# Patient Record
Sex: MALE | Race: WHITE | Employment: OTHER | URBAN - METROPOLITAN AREA
[De-identification: names, ages, dates, MRNs, and addresses within clinical notes are randomized per-mention and may not be internally consistent; named-entity substitution may affect disease eponyms.]

---

## 2017-06-02 ENCOUNTER — DOCUMENTATION ONLY (OUTPATIENT)
Dept: ONCOLOGY | Age: 82
End: 2017-06-02

## 2017-06-02 ENCOUNTER — TELEPHONE (OUTPATIENT)
Dept: ONCOLOGY | Age: 82
End: 2017-06-02

## 2017-06-02 NOTE — TELEPHONE ENCOUNTER
I will also send a copy of this to his PCP, Dr. Billy Begum. I described what is included in the survivorship care plan.  I also encouraged him to take it to his next appt with Dr. Pedro Andre for any questions he may have about it.  Also, encouraged him to contact me if he needs additional support, education or area resources, or has any follow-up questions.  He is agreeable to this plan.

## 2017-06-02 NOTE — PROGRESS NOTES
[address], [facility] 22.    Prostate Cancer Survivorship Care Plan    GENERAL INFORMATION    NAME/AGE/GENDER: Marco Brock is a 80 y.o. male who was born on 9/6/1933. PHONE: [phone]     Date: 6/2/2017    Referring Provider: Dr. Rocky Murphy    Patient Care Team:  Andrew Webb MD as PCP - [facility] [phone]  Kaitlin Downs MD as Physician (Radiation Oncology) [facility] [phone]  Nelida Merida MD as Physician (Urology) [address] [place] [address] Urology [phone]  Mac Lynch NP as Nurse Practitioner (Cancer Survivorship) Medical Oncology [phone]    DIAGNOSIS    Cancer type/histologic subtype: Prostate cancer/Adenocarcinoma    Dimitri Score: 4 + 5 = 9 in one core and 4 + 3 = 7 in 2 additional cores    PSA at Diagnosis: 8.61 ng/mL on 7/25/16       Date of biopsy: 10/13/16    TNM: [id number]    Risk grouping: High risk    FOLLOW-UP CARE PLAN    Cancer Surveillance or Other Recommended Related Tests        Name of test  When/How often Ordering Provider   PSA Every 3-6 months for the first 2 years, every 6 months for years 3-5, and annually after 5 years Dr. Rocky Murphy  and Dr. Guillermo Araujo (WINDY) Every year Dr. Rocky Murphy and Dr. Tatyana Nicole     Please continue to see your primary care provider for all general health care recommended for a man your age, including cancer screening tests.     Possible late and long-term effects that someone with this type of cancer and treatment may experience:  nocturia (nighttime urination), dysuria (painful or difficult urination), hematuria (blood in the urine), increased daytime urinary frequency, urinary obstruction/retention, urethral stricture formation, incontinence of urine or stool, acute and chronic cystitis (bladder inflammation), diarrhea, rectal damage/bleeding, acute and chronic proctitis (rectal inflammation), worsening and permanent erectile dysfunction, secondary cancers, and the potential need for supplemental treatment    Schedule of Clinical Visits        Coordinating Provider  When/How often Contact information   Primary Care Provider As needed for non-cancer health care [phone]   Radiation Oncologist As required by specific treatment plan [phone]     Urologist As required by specific treatment plan [phone]         CANCER TREATMENT SUMMARY    Surgery: Date and procedure/location/findings: No    External Beam Radiation: Yes, IMRT (intensity modulated radiation therapy)    Prostate, Seminal Vesicles and Pelvic Lymph Nodes: Yes End date (year): 2/9/17   Total dose/location: 4500 cGy to pelvic lymph nodes and 7740 cGy to prostate and seminal vesicles     Brachytherapy to Prostate: No     Systemic Therapy (chemotherapy, biologics, other): No    Persistent symptoms or side effects at completion of treatment: At your follow-up appointment with Dr. Carmita Murray, you were continuing to improve. You described nocturia (night-time voiding) of 3-4 times per night and a daytime voiding interval of 3-4 hours. The only other symptom mentioned in your office note is that your hot flashes have remained stable.     Clinical Trial: No     Date of other cancer and/or recurrence of primary cancer and subsequent treatment: none, according to documentation in [facility], your [address] [place] [address] record or in [facility], your radiation therapy medical record    FAMILIAL CANCER RISK ASSESSMENT    Family history of cancer: your mother had either colon cancer or cervical cancer at age 89--the two medical records referred to above have differing family history of cancer    Genetic/hereditary risk factor(s) or predisposing conditions: none apparent  Genetic testing: No     CONTINUING TREATMENT    Need for Ongoing (Adjuvant) Treatment for Cancer: Yes, Leuprolide (Lupron) 45 mg by intramuscular (IM) injections every 6 months x 2 years; you began this treatment on 11/15/16 and had your second injection on 5/17/17                Additional treatment name How often/For how long Possible side effects   Leuprolide (Lupron) IM injections or  Goserelin (Zoladex)  Triptorelin (Trelstar)  Histrelin (Vantas)  LHRH  (lutenizing hormone-releasing hormone) agonist  Frequency depends on dosage ordered by your urologist/Recommended for 2-3 years Hot flashes/night sweats, decreased libido (sex drive), erectile dysfunction, increased bone pain, urinary retention, swelling of feet or ankles, elevated cholesterol, loss of strength     DOING YOUR PART AS A CANCER SURVIVOR    Many survivors feel worried or anxious that the cancer will come back after treatment. While it often does not, its important to talk with your doctor about the possibility of the cancer returning. Tell your doctor if you notice any of the following symptoms, as they may be signs of a cancer recurrence:     · Bone pain  · Chest pain  · Abdominal pain  · Shortness of breath or difficulty breathing  · Persistent headaches  · Persistent coughing    Or any other symptoms should be brought to the attention of your provider:   1. Anything that represents a brand new symptom   2. Anything that represents a persistent symptom   3.  Anything you are worried about that might be related to the cancer coming  back    Prostate Cancer Survivorship Guidelines:  1--Achieve/maintain a healthy weight by limiting consumption of high-calorie foods and beverages and increasing physical activity  2--Engage in at least 150 minutes/week of physical activity, which may include weight-bearing exercises  3--Eat a diet high in fruits, vegetables and whole grains and low in saturated fat  4--Take at least 600 units of vitamin D daily and have no more than 1200 mg of calcium in your daily diet  5--Limit alcohol intake to 2 servings/day or less  6--Quit or don't start to use any tobacco products  ACS Prostate Cancer Survivorship Care Guidelines July/August 2014    Cancer survivors may experience issues with the areas listed below. If you have any concerns in these or other areas, please speak with your survivorship nurse practitioner or a member of your physician team to find out how to get help with them. Anxiety or depression, emotional and mental health, fatigue, weight changes, stopping smoking, physical functioning, insurance, financial advice/assistance, school/work issues, parenting, memory or concentration loss, fertility, sexual functioning, or any other survivorship concerns            A number of lifestyle/behaviors can affect your ongoing health, including the risk for the cancer coming back or developing another cancer. Discuss these recommendations with your doctor or nurse practitioner:    Tobacco use/cessation, diet, alcohol use, weight management (loss/gain), physical activity, sunscreen use, management of medications, management of other illnesses, or any other healthy living recommendations you'd like to discuss  [address]  Prostate Cancer Specific:  The Prostate [address] [place] [address] prostate. club  Prostate [facility] [address] [place] [address] [address] www.pcf.org  The Prostate Net www. prostate-online. org  Us Too www.ustoo. org  Zero The End of Prostate Cancer www.zerocancer. org  Prostate Cancer Survivorship InterviewAlert.dk  Long-term Care Guidelines for Prostate Cancer Survivors http://Verical/. org/cancer/news/news/longterm-care-guidelines-for-prostate-cancer-survivors   Hormone Deprivation Symptoms in Men http://Verical/. net/navigating-cancer-care/side-effects/hormone-deprivation-symptoms-men   The National Association for Continence 69 849 69 22  Wound Ostomy and Continence Nurses Society www.wocn. org    General:  [place] www.[facility]. [facility] www.cancer. [address] www.cancer. org  American Society of Clinical Oncology http://ryan-cuevas.com/. net/research-and-advocacy/asco-care-and-treatment-  recommendations-patients/follow-care-breast-cancer  [facility] [address] www.AdNectarr. org  Cancer and Careers www.cancerandcareers. org    Prepared By: Fernanda HOLDER with input from your radiation oncologist  [address] [place] [address]  [phone] or [phone]  [email or web address]    Delivered on: 6/2/17    This [facility] is a cancer treatment summary and follow-up plan provided to you to keep with your healthcare records and to share with your healthcare providers. This summary is a brief record of major aspects of your cancer treatment, not a detailed or comprehensive record of your care.

## 2017-10-03 ENCOUNTER — HOSPITAL ENCOUNTER (OUTPATIENT)
Dept: GENERAL RADIOLOGY | Age: 82
Discharge: HOME | End: 2017-10-03
Attending: PHYSICAL MEDICINE & REHABILITATION
Payer: MEDICARE

## 2017-10-03 ENCOUNTER — HOSPITAL ENCOUNTER (OUTPATIENT)
Dept: MRI IMAGING | Age: 82
Discharge: HOME | End: 2017-10-03
Attending: PHYSICAL MEDICINE & REHABILITATION

## 2017-10-03 DIAGNOSIS — M54.16 LUMBAR RADICULOPATHY: ICD-10-CM

## 2017-10-03 DIAGNOSIS — M54.17 LUMBOSACRAL RADICULITIS: ICD-10-CM

## 2017-10-03 PROCEDURE — 72110 X-RAY EXAM L-2 SPINE 4/>VWS: CPT

## 2017-10-12 ENCOUNTER — HOSPITAL ENCOUNTER (OUTPATIENT)
Dept: GENERAL RADIOLOGY | Age: 82
Discharge: HOME | End: 2017-10-12
Payer: MEDICARE

## 2017-10-12 DIAGNOSIS — M19.90 OSTEOARTHRITIS: ICD-10-CM

## 2017-10-12 PROCEDURE — 73521 X-RAY EXAM HIPS BI 2 VIEWS: CPT

## 2018-06-19 ENCOUNTER — HOSPITAL ENCOUNTER (EMERGENCY)
Age: 83
Discharge: HOME | End: 2018-06-19
Attending: EMERGENCY MEDICINE
Payer: MEDICARE

## 2018-06-19 VITALS
SYSTOLIC BLOOD PRESSURE: 199 MMHG | TEMPERATURE: 98.8 F | RESPIRATION RATE: 18 BRPM | HEART RATE: 69 BPM | HEIGHT: 73 IN | OXYGEN SATURATION: 97 % | DIASTOLIC BLOOD PRESSURE: 88 MMHG | BODY MASS INDEX: 32.2 KG/M2 | WEIGHT: 243 LBS

## 2018-06-19 DIAGNOSIS — M54.50 ACUTE BILATERAL LOW BACK PAIN WITHOUT SCIATICA: Primary | ICD-10-CM

## 2018-06-19 PROCEDURE — 99282 EMERGENCY DEPT VISIT SF MDM: CPT

## 2018-06-19 RX ORDER — METHYLPREDNISOLONE 4 MG/1
TABLET ORAL
Qty: 1 DOSE PACK | Refills: 0 | Status: SHIPPED | OUTPATIENT
Start: 2018-06-19

## 2018-06-19 RX ORDER — TRAMADOL HYDROCHLORIDE 50 MG/1
50 TABLET ORAL
Qty: 20 TAB | Refills: 0 | OUTPATIENT
Start: 2018-06-19 | End: 2020-07-04

## 2018-06-19 NOTE — ED PROVIDER NOTES
HPI Comments:   Patient and 70-year-old white male who presents emergency Department with low back pain for the last 5 days. Prior to the onset of his back pain, patient was lifting a heavy box full of dishes and felt a sudden strain in his lower back. Over the past few days, the patient was also seen by nurse practitioner who prescribed him some Valium for back spasm. He is no longer having spasm symptoms and is no longer using the Valium and has been using NSAIDs with partial relief. He presents the emergency department because of pain predominantly when he is sleeping at night and while he is ambulating. He denies a fall or any other injury. She denies distal neurological symptoms weren't trouble with bowel or bladder control. He has no other complaints at this time. He denies abdominal pain or flank pain. The history is provided by the patient.         Past Medical History:   Diagnosis Date    Actinic keratosis 2009    right ear - Dr. Brianna Timmons Ascending aorta dilatation ([address]     4.4cm (11/20/13 echo)     Atrial fibrillation ([address] 3/26/2010    Blood type B+     BPH (benign prostatic hyperplasia) 3/26/2010    CAD (coronary artery disease), 70-80% distal obtuse marginal 5/11/2007    Cardiac [facility] at [facility] by Dr. Favian Paul Carotid stenosis 3/26/2010    15-49% confirmed bilat 4/2012    Chronic rhinitis 3/26/2010    CVA (cerebral vascular accident) ([address] 3/26/2010    GERD (gastroesophageal reflux disease) 3/26/2010    HTN (hypertension) 3/26/2010    Hypercholesterolemia     normal particle number    Impaired glucose tolerance 7/12/2010    Kidney stones 3/26/2010    Dr. Zane Driscoll Ocular migraine 3/26/2010    Polycythemia     Rectus diastasis 3/26/2010    Testosterone deficiency 1/20/2012    VENTRAL HERNIA 3/26/2010    Vitamin D deficiency        Past Surgical History:   Procedure Laterality Date    CYSTOSCOPY,RESEC EJACULATORY DUCT  2003    kidney stone    ENDOSCOPY, COLON, DIAGNOSTIC 5/05    -d/t fam hx-sched before 8 yrs    HX APPENDECTOMY      age 5    [address]  [address]     -afib conversion 4/08    HX MOHS PROCEDURES  2008    -advanced ortho         Family History:   Problem Relation Age of Onset    Cancer Mother 80     colon    Arthritis-osteo Daughter     Arthritis-osteo Son      osteonecrosis of hip    Depression Son        Social History     Social History    Marital status: SINGLE     Spouse name: N/A    Number of children: N/A    Years of education: N/A     Occupational History    Not on file. Social History Main Topics    Smoking status: Never Smoker    Smokeless tobacco: Never Used    Alcohol use 1.0 oz/week     2 drink(s) per week    Drug use: No    Sexual activity: Yes     Partners: Female     Other Topics Concern    Not on file     Social History Narrative         ALLERGIES: Fosamax [alendronate]; Naprosyn [naproxen]; and Pradaxa [dabigatran etexilate]    Review of Systems   Constitutional: Negative. Negative for appetite change, fever and unexpected weight change. HENT: Negative. Negative for ear pain, hearing loss, nosebleeds, rhinorrhea, sore throat and trouble swallowing. Respiratory: Negative. Negative for cough, chest tightness and shortness of breath. Cardiovascular: Negative. Negative for chest pain and palpitations. Gastrointestinal: Negative. Negative for abdominal distention, abdominal pain, blood in stool and vomiting. Endocrine: Negative. Genitourinary: Negative for dysuria and hematuria. Musculoskeletal: Positive for back pain. Negative for myalgias. Skin: Negative. Negative for rash. Allergic/Immunologic: Negative. Neurological: Negative. Negative for dizziness, syncope, weakness and numbness. Hematological: Negative. Psychiatric/Behavioral: Negative. All other systems reviewed and are negative.       Vitals:    06/19/18 1422   BP: 199/88   Pulse: 69   Resp: 18   Temp: 98.8 °F (37.1 °C)   SpO2: 97%   Weight: 110.2 kg (243 lb)   Height: 6' 1\" (1.854 m)            Physical Exam   Constitutional: He is oriented to person, place, and time. He appears well-developed and well-nourished. No distress. HENT:   Head: Normocephalic and atraumatic. Right Ear: External ear normal.   Left Ear: External ear normal.   Nose: Nose normal.   Mouth/Throat: Oropharynx is clear and moist.   Eyes: Conjunctivae and EOM are normal. Pupils are equal, round, and reactive to light. Neck: Normal range of motion. Neck supple. No JVD present. No thyromegaly present. Cardiovascular: Normal rate, regular rhythm, normal heart sounds and intact distal pulses. No murmur heard. Pulmonary/Chest: Effort normal and breath sounds normal. No respiratory distress. He has no wheezes. He has no rales. Abdominal: Soft. Bowel sounds are normal. He exhibits no distension. There is no tenderness. Musculoskeletal: Normal range of motion. He exhibits no edema. Bilateral lumbosacral discomfort to palpation. No midline tenderness. No swelling, no erythema. 5 out of 5 strength distally in all extremities. No saddle anesthesia. Neurological: He is alert and oriented to person, place, and time. No cranial nerve deficit. Skin: Skin is warm and dry. No rash noted. Psychiatric: He has a normal mood and affect. His behavior is normal. Thought content normal.   Vitals reviewed. MDM  Number of Diagnoses or Management Options  Acute bilateral low back pain without sciatica:   Diagnosis management comments:   Assessment and plan: Acute lower back strain. Likely musculoskeletal. No suspicion for compression fracture or other acute skeletal anomaly. We'll continue conservative management with the addition of short-term pain medication as well as a steroid pack. Encourage continued use of NSAIDs as needed. Gentle stretching, heat, massage.  Patient is to follow up with his primary care physician if symptoms are not resolved within one week. Patient understands and agrees with plan.     Risk of Complications, Morbidity, and/or Mortality  Presenting problems: low  Diagnostic procedures: low  Management options: low    Patient Progress  Patient progress: stable        ED Course       Procedures

## 2018-06-19 NOTE — DISCHARGE INSTRUCTIONS

## 2018-06-19 NOTE — ED TRIAGE NOTES
Pt ambulatory to treatment area with c/o \"5 days ago I lifted a heavy box of dishes and I hurt my lower back. \"  Pt denies numbness or tingling, loss of bowel or bladder control. Pt reports taking ibuprofen this morning without relief.

## 2020-05-04 ENCOUNTER — HOSPITAL ENCOUNTER (EMERGENCY)
Age: 85
Discharge: HOME | End: 2020-05-04
Attending: EMERGENCY MEDICINE | Admitting: EMERGENCY MEDICINE
Payer: MEDICARE

## 2020-05-04 VITALS
RESPIRATION RATE: 20 BRPM | SYSTOLIC BLOOD PRESSURE: 170 MMHG | HEART RATE: 75 BPM | DIASTOLIC BLOOD PRESSURE: 79 MMHG | BODY MASS INDEX: 30.48 KG/M2 | WEIGHT: 230 LBS | HEIGHT: 73 IN | TEMPERATURE: 98.6 F | OXYGEN SATURATION: 95 %

## 2020-05-04 DIAGNOSIS — S05.02XA CORNEAL ABRASION, LEFT, INITIAL ENCOUNTER: Primary | ICD-10-CM

## 2020-05-04 PROCEDURE — 99282 EMERGENCY DEPT VISIT SF MDM: CPT

## 2020-05-04 PROCEDURE — 74011000250 HC RX REV CODE- 250: Performed by: EMERGENCY MEDICINE

## 2020-05-04 RX ORDER — CIPROFLOXACIN HYDROCHLORIDE 3.5 MG/ML
1 SOLUTION/ DROPS TOPICAL 4 TIMES DAILY
Qty: 2.5 ML | Refills: 0 | Status: SHIPPED | OUTPATIENT
Start: 2020-05-04 | End: 2020-05-11

## 2020-05-04 RX ORDER — TETRACAINE HYDROCHLORIDE 5 MG/ML
1 SOLUTION OPHTHALMIC
Status: DISCONTINUED | OUTPATIENT
Start: 2020-05-04 | End: 2020-05-04 | Stop reason: SDUPTHER

## 2020-05-04 RX ORDER — TETRACAINE HYDROCHLORIDE 5 MG/ML
1 SOLUTION OPHTHALMIC
Status: COMPLETED | OUTPATIENT
Start: 2020-05-04 | End: 2020-05-04

## 2020-05-04 RX ORDER — CIPROFLOXACIN HYDROCHLORIDE 3.5 MG/ML
1 SOLUTION/ DROPS TOPICAL 4 TIMES DAILY
Qty: 2.5 ML | Refills: 0 | Status: SHIPPED | OUTPATIENT
Start: 2020-05-04 | End: 2020-05-04

## 2020-05-04 RX ADMIN — TETRACAINE HYDROCHLORIDE 1 DROP: 5 SOLUTION OPHTHALMIC at 08:40

## 2020-05-04 RX ADMIN — FLUORESCEIN SODIUM 1 STRIP: 1 STRIP OPHTHALMIC at 08:40

## 2020-05-04 NOTE — ED TRIAGE NOTES
Triage Note: Patient is coming in for foreign object in the left eye for 1 week. Patient denies pain.

## 2020-05-06 NOTE — ED PROVIDER NOTES
59-year-old male presents with a foreign body sensation to his left eye. Present for the past several days. He is not sure what he could have gotten in it. He is tried rinsing it but without improvement. Denies any changes in his vision. No fever, headache, visual changes. No cough or shortness of breath. Does not wear contact lenses.            Past Medical History:   Diagnosis Date    Actinic keratosis 2009    right ear - Dr. Estefani Demarco Ascending aorta dilatation ([address]     4.4cm (11/20/13 echo)     Atrial fibrillation ([address] 3/26/2010    Blood type B+     BPH (benign prostatic hyperplasia) 3/26/2010    CAD (coronary artery disease), 70-80% distal obtuse marginal 5/11/2007    [facility] at [facility] by Dr. Asaf Cristina Carotid stenosis 3/26/2010    15-49% confirmed bilat 4/2012    Chronic rhinitis 3/26/2010    CVA (cerebral vascular accident) ([address] 3/26/2010    GERD (gastroesophageal reflux disease) 3/26/2010    HTN (hypertension) 3/26/2010    Hypercholesterolemia     normal particle number    Impaired glucose tolerance 7/12/2010    Kidney stones 3/26/2010    Dr. Sameera Mcintosh    Ocular migraine 3/26/2010    Polycythemia     Rectus diastasis 3/26/2010    Testosterone deficiency 1/20/2012    VENTRAL HERNIA 3/26/2010    Vitamin D deficiency        Past Surgical History:   Procedure Laterality Date    CYSTOSCOPY,RESEC EJACULATORY DUCT  2003    kidney stone    ENDOSCOPY, COLON, DIAGNOSTIC  5/05    -d/t fam hx-sched before 10 yrs    HX APPENDECTOMY      age 5    [address] [facility] [address]  5/07     -afib conversion 4/08    HX MOHS PROCEDURES  2008    -advanced ortho         Family History:   Problem Relation Age of Onset    Cancer Mother 80        colon    Arthritis-osteo Daughter     Arthritis-osteo Son         osteonecrosis of hip    Depression Son        Social History     Socioeconomic History    Marital status: SINGLE     Spouse name: Not on file    Number of children: Not on file    Years of education: Not on file    Highest education level: Not on file   Occupational History    Not on file   Social Needs    Financial resource strain: Not on file    Food insecurity     Worry: Not on file     Inability: Not on file    Transportation needs     Medical: Not on file     Non-medical: Not on file   Tobacco Use    Smoking status: Never Smoker    Smokeless tobacco: Never Used   Substance and Sexual Activity    Alcohol use: Yes     Alcohol/week: 1.7 standard drinks     Types: 2 drink(s) per week    Drug use: No    Sexual activity: Yes     Partners: Female   Lifestyle    Physical activity     Days per week: Not on file     Minutes per session: Not on file    Stress: Not on file   Relationships    Social connections     Talks on phone: Not on file     Gets together: Not on file     Attends Latter-day service: Not on file     Active member of club or organization: Not on file     Attends meetings of clubs or organizations: Not on file     Relationship status: Not on file    Intimate partner violence     Fear of current or ex partner: Not on file     Emotionally abused: Not on file     Physically abused: Not on file     Forced sexual activity: Not on file   Other Topics Concern    Not on file   Social History Narrative    Not on file         ALLERGIES: Fosamax [alendronate]; Naprosyn [naproxen]; and Pradaxa [dabigatran etexilate]    Review of Systems   Constitutional: Negative for diaphoresis and fever. HENT: Negative for facial swelling. Eyes: Negative for visual disturbance. Respiratory: Negative for cough. Cardiovascular: Negative for chest pain. Gastrointestinal: Negative for abdominal pain. Genitourinary: Negative for dysuria. Musculoskeletal: Negative for joint swelling. Skin: Negative for rash. Neurological: Negative for headaches. Hematological: Negative for adenopathy. Psychiatric/Behavioral: Negative for suicidal ideas.        Vitals: 05/04/20 0812   BP: 170/79   Pulse: 75   Resp: 20   Temp: 98.6 °F (37 °C)   SpO2: 95%   Weight: 104.3 kg (230 lb)   Height: 6' 1\" (1.854 m)            Physical Exam  Vitals signs and nursing note reviewed. Constitutional:       General: He is not in acute distress. Appearance: He is well-developed. HENT:      Head: Normocephalic and atraumatic. Eyes:      Pupils: Pupils are equal, round, and reactive to light. Comments: Corneal abrasion is seen on fluorescein stain and Woods lamp. Neck:      Musculoskeletal: Normal range of motion and neck supple. Cardiovascular:      Rate and Rhythm: Normal rate. Pulmonary:      Effort: Pulmonary effort is normal. No respiratory distress. Abdominal:      General: There is no distension. Musculoskeletal: Normal range of motion. Skin:     General: Skin is warm and dry. Neurological:      Mental Status: He is alert and oriented to person, place, and time. MDM  Number of Diagnoses or Management Options  Corneal abrasion, left, initial encounter:   Diagnosis management comments: Assessment: Corneal abrasion present on Woods lamp exam.  Stable for discharge home with antibiotic eyedrops. Patient advised to follow-up with [facility] in 2 to 3 days if no improvement.          Procedures

## 2020-07-04 ENCOUNTER — APPOINTMENT (OUTPATIENT)
Dept: CT IMAGING | Age: 85
End: 2020-07-04
Attending: PHYSICIAN ASSISTANT
Payer: MEDICARE

## 2020-07-04 ENCOUNTER — HOSPITAL ENCOUNTER (EMERGENCY)
Age: 85
Discharge: HOME | End: 2020-07-04
Attending: EMERGENCY MEDICINE | Admitting: EMERGENCY MEDICINE
Payer: MEDICARE

## 2020-07-04 VITALS
DIASTOLIC BLOOD PRESSURE: 97 MMHG | RESPIRATION RATE: 18 BRPM | HEART RATE: 82 BPM | OXYGEN SATURATION: 94 % | SYSTOLIC BLOOD PRESSURE: 136 MMHG | WEIGHT: 222 LBS | HEIGHT: 73 IN | TEMPERATURE: 98 F | BODY MASS INDEX: 29.42 KG/M2

## 2020-07-04 DIAGNOSIS — S32.020A COMPRESSION FRACTURE OF L2 VERTEBRA, INITIAL ENCOUNTER (HCC): Primary | ICD-10-CM

## 2020-07-04 LAB
ANION GAP SERPL CALC-SCNC: 9 MMOL/L (ref 5–15)
BASOPHILS # BLD: 0.1 K/UL (ref 0–0.1)
BASOPHILS NFR BLD: 1 % (ref 0–1)
BUN SERPL-MCNC: 13 MG/DL (ref 6–20)
BUN/CREAT SERPL: 13 (ref 12–20)
CALCIUM SERPL-MCNC: 9.1 MG/DL (ref 8.5–10.1)
CHLORIDE SERPL-SCNC: 104 MMOL/L (ref 97–108)
CO2 SERPL-SCNC: 22 MMOL/L (ref 21–32)
COMMENT, HOLDF: NORMAL
CREAT SERPL-MCNC: 0.99 MG/DL (ref 0.7–1.3)
DIFFERENTIAL METHOD BLD: ABNORMAL
EOSINOPHIL # BLD: 0.1 K/UL (ref 0–0.4)
EOSINOPHIL NFR BLD: 1 % (ref 0–7)
ERYTHROCYTE [DISTWIDTH] IN BLOOD BY AUTOMATED COUNT: 13.2 % (ref 11.5–14.5)
GLUCOSE SERPL-MCNC: 152 MG/DL (ref 65–100)
HCT VFR BLD AUTO: 50.2 % (ref 36.6–50.3)
HGB BLD-MCNC: 17 G/DL (ref 12.1–17)
IMM GRANULOCYTES # BLD AUTO: 0 K/UL (ref 0–0.04)
IMM GRANULOCYTES NFR BLD AUTO: 0 % (ref 0–0.5)
INR PPP: 2.2 (ref 0.9–1.1)
LYMPHOCYTES # BLD: 0.9 K/UL (ref 0.8–3.5)
LYMPHOCYTES NFR BLD: 10 % (ref 12–49)
MCH RBC QN AUTO: 31.8 PG (ref 26–34)
MCHC RBC AUTO-ENTMCNC: 33.9 G/DL (ref 30–36.5)
MCV RBC AUTO: 94 FL (ref 80–99)
MONOCYTES # BLD: 1.2 K/UL (ref 0–1)
MONOCYTES NFR BLD: 13 % (ref 5–13)
NEUTS SEG # BLD: 6.8 K/UL (ref 1.8–8)
NEUTS SEG NFR BLD: 75 % (ref 32–75)
NRBC # BLD: 0 K/UL (ref 0–0.01)
NRBC BLD-RTO: 0 PER 100 WBC
PLATELET # BLD AUTO: 144 K/UL (ref 150–400)
PMV BLD AUTO: 9.7 FL (ref 8.9–12.9)
POTASSIUM SERPL-SCNC: 3.8 MMOL/L (ref 3.5–5.1)
PROTHROMBIN TIME: 21.1 SEC (ref 9–11.1)
RBC # BLD AUTO: 5.34 M/UL (ref 4.1–5.7)
SAMPLES BEING HELD,HOLD: NORMAL
SODIUM SERPL-SCNC: 135 MMOL/L (ref 136–145)
WBC # BLD AUTO: 9.1 K/UL (ref 4.1–11.1)

## 2020-07-04 PROCEDURE — 99284 EMERGENCY DEPT VISIT MOD MDM: CPT

## 2020-07-04 PROCEDURE — 85025 COMPLETE CBC W/AUTO DIFF WBC: CPT

## 2020-07-04 PROCEDURE — 36415 COLL VENOUS BLD VENIPUNCTURE: CPT

## 2020-07-04 PROCEDURE — 80048 BASIC METABOLIC PNL TOTAL CA: CPT

## 2020-07-04 PROCEDURE — 72131 CT LUMBAR SPINE W/O DYE: CPT

## 2020-07-04 PROCEDURE — 85610 PROTHROMBIN TIME: CPT

## 2020-07-04 PROCEDURE — 74011250637 HC RX REV CODE- 250/637: Performed by: PHYSICIAN ASSISTANT

## 2020-07-04 RX ORDER — TRAMADOL HYDROCHLORIDE 50 MG/1
50 TABLET ORAL
Status: COMPLETED | OUTPATIENT
Start: 2020-07-04 | End: 2020-07-04

## 2020-07-04 RX ORDER — TRAMADOL HYDROCHLORIDE 50 MG/1
50 TABLET ORAL
Qty: 10 TAB | Refills: 0 | Status: SHIPPED | OUTPATIENT
Start: 2020-07-04 | End: 2020-07-07

## 2020-07-04 RX ORDER — LIDOCAINE 50 MG/G
PATCH TOPICAL
Qty: 15 EACH | Refills: 0 | Status: SHIPPED | OUTPATIENT
Start: 2020-07-04

## 2020-07-04 RX ADMIN — TRAMADOL HYDROCHLORIDE 50 MG: 50 TABLET, FILM COATED ORAL at 18:04

## 2020-07-04 NOTE — ED PROVIDER NOTES
Griffin Adams is a 80 y.o. male  who presents by EMS to ER with c/o Patient presents with:  Back Pain  PAtient presents with complaints of lower back pain. Patient reports a fall 1 week ago. Patient was in a sitting position and fell back. Patient denies head injury or LOC. Patient denies bowel or bladder incontinence or saddle anesthesia. Patient has been taking tylenol for pain with no relief. Patient with history of Atrial fibrillation and is on coumadin. He specifically denies any fevers, chills, nausea, vomiting, chest pain, shortness of breath, headache, rash, diarrhea, abdominal pain, urinary/bowel changes, sweating or weight loss.     PCP: Jodee Horn MD   PMHx significant for: Past Medical History:  2009: Actinic keratosis      Comment:  right ear - Dr. De La Torre Files  No date: Ascending aorta dilatation ([facility] [address]      Comment:  4.4cm (11/20/13 echo)   3/26/2010: Atrial fibrillation ([facility] [address]  No date: Blood type B+  3/26/2010: BPH (benign prostatic hyperplasia)  5/11/2007: CAD (coronary artery disease), 70-80% distal obtuse   marginal      Comment:  Cardiac cath at [facility] by Dr. Sergey Crenshaw  3/26/2010: Carotid stenosis      Comment:  15-49% confirmed bilat 4/2012  3/26/2010: Chronic rhinitis  3/26/2010: CVA (cerebral vascular accident) ([facility] [address]  3/26/2010: GERD (gastroesophageal reflux disease)  3/26/2010: HTN (hypertension)  No date: Hypercholesterolemia      Comment:  normal particle number  7/12/2010: Impaired glucose tolerance  3/26/2010: Kidney stones      Comment:  Dr. Michelle Peters  3/26/2010: Ocular migraine  No date: Polycythemia  3/26/2010: Rectus diastasis  1/20/2012: Testosterone deficiency  3/26/2010: VENTRAL HERNIA  No date: Vitamin D deficiency   PSHx significant for: Past Surgical History:  2003: CYSTOSCOPY,RESEC EJACULATORY DUCT      Comment:  kidney stone  5/05: ENDOSCOPY, COLON, DIAGNOSTIC      Comment:  -d/t fam hx-sched before 10 yrs  No date: HX APPENDECTOMY      Comment:  age 5  5/07: [address] CATHETERIZATION      Comment:   -jaycobib conversion 4/08 2008: HX MOHS PROCEDURES      Comment:  -[facility]  Social Hx: Tobacco use: Social History    Tobacco Use      Smoking status: Never Smoker      Smokeless tobacco: Never Used  ; EtOH use: The patient states he drinks 0 per week.; Illicit Drug use: Allergies:   -- Fosamax (Alendronate) -- Myalgia   -- Naprosyn (Naproxen) -- Nausea Only   -- Pradaxa (Dabigatran Etexilate) -- Other (comments)    --  Gross hematuria    There are no other complaints, changes or physical findings at this time.               Past Medical History:   Diagnosis Date    Actinic keratosis 2009    right ear - Dr. Maria Del Carmen Caldwell Ascending aorta dilatation ([address]     4.4cm (11/20/13 echo)     Atrial fibrillation ([address] 3/26/2010    Blood type B+     BPH (benign prostatic hyperplasia) 3/26/2010    CAD (coronary artery disease), 70-80% distal obtuse marginal 5/11/2007    [facility] at [facility] by Dr. Karma Javier Carotid stenosis 3/26/2010    15-49% confirmed bilat 4/2012    Chronic rhinitis 3/26/2010    CVA (cerebral vascular accident) ([address] 3/26/2010    GERD (gastroesophageal reflux disease) 3/26/2010    HTN (hypertension) 3/26/2010    Hypercholesterolemia     normal particle number    Impaired glucose tolerance 7/12/2010    Kidney stones 3/26/2010    Dr. Joseph Hernandez    Ocular migraine 3/26/2010    Polycythemia     Rectus diastasis 3/26/2010    Testosterone deficiency 1/20/2012    VENTRAL HERNIA 3/26/2010    Vitamin D deficiency        Past Surgical History:   Procedure Laterality Date    CYSTOSCOPY,RESEC EJACULATORY DUCT  2003    kidney stone    ENDOSCOPY, COLON, DIAGNOSTIC  5/05    -d/t fam hx-sched before 10 yrs    HX APPENDECTOMY      age 5    [address]  5/07     -afib conversion 4/08    HX MOHS PROCEDURES  2008    -[facility]         Family History:   Problem Relation Age of Onset    Cancer Mother 80 colon    Arthritis-osteo Daughter     Arthritis-osteo Son         osteonecrosis of hip    Depression Son        Social History     Socioeconomic History    Marital status:      Spouse name: Not on file    Number of children: Not on file    Years of education: Not on file    Highest education level: Not on file   Occupational History    Not on file   Social Needs    Financial resource strain: Not on file    Food insecurity     Worry: Not on file     Inability: Not on file   [employer] needs     Medical: Not on file     Non-medical: Not on file   Tobacco Use    Smoking status: Never Smoker    Smokeless tobacco: Never Used   Substance and Sexual Activity    Alcohol use: Yes     Alcohol/week: 1.7 standard drinks     Types: 2 drink(s) per week    Drug use: No    Sexual activity: Yes     Partners: Female   Lifestyle    Physical activity     Days per week: Not on file     Minutes per session: Not on file    Stress: Not on file   Relationships    Social connections     Talks on phone: Not on file     Gets together: Not on file     Attends Hoahaoism service: Not on file     Active member of club or organization: Not on file     Attends meetings of clubs or organizations: Not on file     Relationship status: Not on file    Intimate partner violence     Fear of current or ex partner: Not on file     Emotionally abused: Not on file     Physically abused: Not on file     Forced sexual activity: Not on file   Other Topics Concern    Not on file   Social History Narrative    Not on file         ALLERGIES: Fosamax [alendronate]; Naprosyn [naproxen]; and Pradaxa [dabigatran etexilate]    Review of Systems   Constitutional: Negative for activity change, appetite change, chills and fever. HENT: Negative for congestion and sore throat. Respiratory: Negative for cough and shortness of breath. Cardiovascular: Negative for chest pain.    Gastrointestinal: Negative for abdominal pain, diarrhea, nausea and vomiting. Genitourinary: Negative for dysuria. Musculoskeletal: Positive for back pain. Negative for arthralgias and myalgias. Skin: Negative for color change. Neurological: Negative for dizziness. Psychiatric/Behavioral: The patient is not nervous/anxious. All other systems reviewed and are negative. Vitals:    07/04/20 1715   BP: (!) 147/95   Pulse: 64   Resp: 18   Temp: 98.1 °F (36.7 °C)   SpO2: 94%   Weight: 100.7 kg (222 lb)   Height: 6' 1\" (1.854 m)            Physical Exam  Vitals signs and nursing note reviewed. Constitutional:       Appearance: He is well-developed. HENT:      Head: Normocephalic and atraumatic. Right Ear: External ear normal.      Left Ear: External ear normal.      Mouth/Throat:      Pharynx: No oropharyngeal exudate. Eyes:      General: No scleral icterus. Right eye: No discharge. Left eye: No discharge. Conjunctiva/sclera: Conjunctivae normal.      Pupils: Pupils are equal, round, and reactive to light. Neck:      Musculoskeletal: Normal range of motion and neck supple. Thyroid: No thyromegaly. Trachea: No tracheal deviation. Cardiovascular:      Rate and Rhythm: Normal rate and regular rhythm. Heart sounds: Normal heart sounds. No murmur. Pulmonary:      Effort: Pulmonary effort is normal. No respiratory distress. Breath sounds: Normal breath sounds. No wheezing or rales. Abdominal:      General: Bowel sounds are normal. There is no distension. Palpations: Abdomen is soft. Tenderness: There is no abdominal tenderness. There is no guarding or rebound. Musculoskeletal: Normal range of motion. General: No tenderness. Comments: Spine palpated with out step off or crepitus. Non-TTP over spine. Full ROM to all extremities. All extremities are NVI. Lymphadenopathy:      Cervical: No cervical adenopathy. Skin:     General: Skin is warm. Findings: No erythema or rash. Neurological:      Mental Status: He is alert and oriented to person, place, and time. Cranial Nerves: No cranial nerve deficit. Coordination: Coordination normal.   Psychiatric:         Behavior: Behavior normal.         Thought Content: Thought content normal.         Judgment: Judgment normal.          MDM  Number of Diagnoses or Management Options  Compression fracture of L2 vertebra, initial encounter [facility]):   Diagnosis management comments: Assesment/Plan- 80 y.o. Patient presents with:  Back Pain  differential includes: muscle strain, compression fracture. Labs and imaging reviewed with CT showing age in determinant compression fracture. Patient with no neurological symptoms, able to ambulate with walker in ED with no difficulty . Recommend neurosurgery follow up. Patient educated on reasons to return to the ED.            Procedures

## 2020-07-04 NOTE — ED TRIAGE NOTES
Patient reports to the ED from home reporting ground level fall to sitting position 1 week ago. Reports increased back pain x1 week. Patient reports did not take anything for the pain. Patient on the coumadin. Seen at OSH for fall.

## 2020-07-04 NOTE — ED NOTES
Discharge instructions and prescriptions x2 reviewed with patient. Circumstances to return to the ED and follow up reviewed. Patient AAO x4, via wheelchair to [email or web address] vehicle. Patient stable and safe for discharge.

## 2020-07-04 NOTE — DISCHARGE INSTRUCTIONS
Patient Education        Compression Fracture of the Spine: Care Instructions  Your Care Instructions     A compression fracture happens when the front part of a spinal bone breaks and collapses. A fall or other accident can cause it. A minor injury or moving the wrong way can cause a break if you have thin or brittle bones (osteoporosis). These types of breaks will heal in 8 to 10 weeks. You will need rest and pain medicines. Your doctor may recommend physical therapy. Some doctors recommend that certain people with compression fractures wear braces. Your doctor also may treat thin or brittle bones. You may need surgery if you have lasting pain or if the bone presses on the spinal cord or nerves. You heal best when you take good care of yourself. Eat a variety of healthy foods, and don't smoke. Follow-up care is a key part of your treatment and safety. Be sure to make and go to all appointments, and call your doctor if you are having problems. It's also a good idea to know your test results and keep a list of the medicines you take. How can you care for yourself at home? · Be safe with medicines. Read and follow all instructions on the label. ? If the doctor gave you a prescription medicine for pain, take it as prescribed. ? If you are not taking a prescription pain medicine, ask your doctor if you can take an over-the-counter medicine. · Talk to your doctor about how to make your bones stronger. You may need medicines or a change in what you eat. · Be active only as directed by your doctor. When should you call for help? [id number] anytime you think you may need emergency care. For example, call if:  · You are unable to move an arm or a leg at all. Call your doctor now or seek immediate medical care if:  · You have new or worse symptoms in your arms, legs, belly, or buttocks. Symptoms may include:  ? Numbness or tingling. ? Weakness. ? Pain. · You lose bladder or bowel control.   · You have belly pain, bloating, vomiting, or nausea. Watch closely for changes in your health, and be sure to contact your doctor if:  · You do not get better as expected. Where can you learn more? Go to http://fabiana-mina.info/  Enter P445 in the search box to learn more about \"Compression Fracture of the Spine: Care Instructions. \"  Current as of: March 2, 2020               Content Version: 12.5  © 2006-2020 [employer]. Care instructions adapted under license by [employer] (which disclaims liability or warranty for this information). If you have questions about a medical condition or this instruction, always ask your healthcare professional. [address] any warranty or liability for your use of this information.

## 2022-03-04 ENCOUNTER — OFFICE VISIT (OUTPATIENT)
Dept: ORTHOPEDIC SURGERY | Age: 87
End: 2022-03-04
Payer: MEDICARE

## 2022-03-04 DIAGNOSIS — Z09 SURGICAL FOLLOW-UP CARE: Primary | ICD-10-CM

## 2022-03-04 PROCEDURE — 99024 POSTOP FOLLOW-UP VISIT: CPT | Performed by: ORTHOPAEDIC SURGERY

## 2022-03-04 NOTE — PROGRESS NOTES
Blanca Schmidt (: 1933) is a 80 y.o. male, established patient, here for evaluation of the following chief complaint(s):  Post OP Follow Up and Hip Pain (right)       ASSESSMENT/PLAN:  Below is the assessment and plan developed based on review of pertinent history, physical exam, labs, studies, and medications. I will plan to see him back in 2 months as needed. His incision looks good and there are no current orthopedic restrictions. 1. Surgical follow-up care      Return in about 2 months (around 2022), or if symptoms worsen or fail to improve. SUBJECTIVE/OBJECTIVE:  Blanca Schmidt (: 1933) is a 80 y.o. male. He is about 1 month status post right hip intramedullary nail. He has no complaints today. He presents from a nursing facility. Allergies   Allergen Reactions    Fosamax [Alendronate] Myalgia    Naprosyn [Naproxen] Nausea Only    Pradaxa [Dabigatran Etexilate] Other (comments)     Gross hematuria       Current Outpatient Medications   Medication Sig    lidocaine (Lidoderm) 5 % Apply patch to the affected area for 12 hours a day and remove for 12 hours a day.  methylPREDNISolone (MEDROL, GENEVIEVE,) 4 mg tablet Use as directed    ergocalciferol (VITAMIN D2) 50,000 unit capsule Take 50,000 Units by mouth every seven (7) days.  ergocalciferol (ERGOCALCIFEROL) 50,000 unit capsule Take 1 Cap by mouth daily.  metoprolol-XL (TOPROL-XL) 25 mg XL tablet Take 1 Tab by mouth daily.  warfarin (COUMADIN) 5 mg tablet Take 5 mg by mouth daily.  rosuvastatin (CRESTOR) 40 mg tablet Take 1 Tab by mouth daily. NON FORMULARY    captopril (CAPOTEN) 25 mg tablet Take 1 Tab by mouth two (2) times a day. No current facility-administered medications for this visit.        Social History     Socioeconomic History    Marital status:      Spouse name: Not on file    Number of children: Not on file    Years of education: Not on file    Highest education level: Not on file Occupational History    Not on file   Tobacco Use    Smoking status: Never Smoker    Smokeless tobacco: Never Used   Substance and Sexual Activity    Alcohol use: Yes     Alcohol/week: 1.7 standard drinks     Types: 2 drink(s) per week    Drug use: No    Sexual activity: Yes     Partners: Female   Other Topics Concern    Not on file   Social History Narrative    Not on file     Social Determinants of Health     Financial Resource Strain:     Difficulty of Paying Living Expenses: Not on file   Food Insecurity:     Worried About Running Out of Food in the Last Year: Not on file    [place] of Food in the Last Year: Not on file   Transportation Needs:     Lack of Transportation (Medical): Not on file    Lack of Transportation (Non-Medical):  Not on file   Physical Activity:     Days of Exercise per Week: Not on file    Minutes of Exercise per Session: Not on file   Stress:     Feeling of Stress : Not on file   Social Connections:     Frequency of Communication with Friends and Family: Not on file    Frequency of Social Gatherings with Friends and Family: Not on file    Attends Buddhism Services: Not on file    Active Member of [address] [place] [address] or Organizations: Not on file    Attends Club or Organization Meetings: Not on file    Marital Status: Not on file   Intimate Partner Violence:     Fear of Current or Ex-Partner: Not on file    Emotionally Abused: Not on file    Physically Abused: Not on file    Sexually Abused: Not on file   Housing Stability:     Unable to Pay for Housing in the Last Year: Not on file    Number of [place] in the Last Year: Not on file    Unstable Housing in the Last Year: Not on file       Past Surgical History:   Procedure Laterality Date    CYSTOSCOPY,RESEC EJACULATORY DUCT  2003    kidney stone    ENDOSCOPY, COLON, DIAGNOSTIC  5/05    -d/t fam hx-sched before 10 yrs    HX APPENDECTOMY      age 5    [address] [facility] [address]  [address]      conversion 4/08    HX MOHS PROCEDURES  2008    -[facility] ortho       Family History   Problem Relation Age of Onset    Cancer Mother 80        colon    OSTEOARTHRITIS Daughter     OSTEOARTHRITIS Son         osteonecrosis of hip    Depression Son                REVIEW OF SYSTEMS:  ROS     Positive for: Musculoskeletal    Last edited by Jigar Zimmerman on 3/4/2022  1:08 PM. (History)        Patient denies any recent fever, chills, nausea, vomiting, chest pain, or shortness of breath. Vitals: There were no vitals taken for this visit. There is no height or weight on file to calculate BMI. PHYSICAL EXAM:  General exam: Patient is awake, alert. He presents on a stretcher. Right hip: Grossly neurovascular and sensory intact. Incisions are well-healed. He has mild pain with attempted range of motion of the hip. Weakness is noted with active range of motion. IMAGING:    XR Results (most recent):  Results from Hospital Encounter encounter on 10/12/17    XR HIPS BI W AP PELV    Narrative  EXAM:  XR HIPS BI W AP PELV  INDICATION:   Unspecified osteoarthritis, unspecified site. COMPARISON: None. FINDINGS: An AP view of the pelvis and frogleg lateral views of both hips  demonstrate no fracture, dislocation or other acute abnormality. There are  degenerative changes of the lumbar spine. There are small linear metallic  implants superior to the symphysis pubis. Impression  IMPRESSION:  No acute abnormality      Results from Hospital Encounter encounter on 10/03/17    XR SPINE LUMB MIN 4 V    Narrative  INDICATION: Lumbosacral radiculitis    EXAM: Lumbar spine. TECHNIQUE: Upright views are obtained of the lumbar spine in lateral projection  during flexion and extension, as well as a neutral lateral view and a frontal  view. FINDINGS: 2 to 3 mm retrolisthesis at L1-2 is unchanged with flexion and  extension. Lumbar vertebral is otherwise normal.    There is no fracture.  There is mild degenerative disc disease L1-L4. There is  aortic calcification. There is degenerative facet sclerosis and interspinous  disease. Impression  IMPRESSION:  1. Mild fixed retrolisthesis L1-2.  2. Multilevel degenerative lumbar disc disease. Results from Appointment encounter on 07/08/11    XR CHEST PA AND LATERAL    Narrative  **Final Report**      ICD Codes / Adm. Diagnosis: 786.2   / COUGH  Examination:  CR CHEST PA AND LATERAL  - [id number] - Jul 8 2011  3:06PM  Accession No:  [id number]  Reason:  RML pleuritic pain      REPORT:  INDICATION:  Right middle lobe pleuritic pain. Cough    COMPARISON: 4.29.2009. FINDINGS: PA and lateral radiographs of the chest demonstrate clear lungs. The cardiac and mediastinal contours and pulmonary vascularity are normal.  Spondylitic changes are noted. IMPRESSION: No acute cardiopulmonary process seen          Signing/Reading Doctor: Camden Chinchilla ([id number])  Transcribed:  on 07/08/2011  Approved: Camden Chinchilla ([id number])  07/08/2011        Distribution:         No orders of the defined types were placed in this encounter. An electronic signature was used to authenticate this note.   -- Georgette Fields, DO

## 2022-03-04 NOTE — LETTER
3/4/2022    Patient: Bernice Fermin   YOB: 1933   Date of Visit: 3/4/2022     Mya Morales, [address] [address] [address]  Via Fax: [phone]    Dear Mya Morales MD,      Thank you for referring Mr. Haley Solorio to [facility] for evaluation. My notes for this consultation are attached. If you have questions, please do not hesitate to call me. I look forward to following your patient along with you.       Sincerely,    Andriy Langston, DO

## 2022-03-04 NOTE — PATIENT INSTRUCTIONS
Date of appointment:  3/4/2022     Examining Physician: Lili Chaudhari      Patient information    Name:  Bernice Fermin                                          YOB: 1933                               Medical record number: [id number]      Reason for visit: Status post right hip intramedullary nail    Follow-up Requested:  PRN/ as needed    Treatment: Physical therapy prescribed    We could not complete x-rays today. There are no orthopedic restrictions at this time. He may weight-bear as tolerated on his right hip. If there is a way to obtain mobile x-rays at the rehab facility that would be great. Please send along a copy of the report to our office. I will plan to see him back in 2 months as needed.     Signed: Madison Roth DO